# Patient Record
Sex: FEMALE | Race: OTHER | URBAN - METROPOLITAN AREA
[De-identification: names, ages, dates, MRNs, and addresses within clinical notes are randomized per-mention and may not be internally consistent; named-entity substitution may affect disease eponyms.]

---

## 2019-02-04 ENCOUNTER — OFFICE VISIT (OUTPATIENT)
Dept: FAMILY MEDICINE CLINIC | Facility: CLINIC | Age: 34
End: 2019-02-04
Payer: COMMERCIAL

## 2019-02-04 VITALS
HEART RATE: 86 BPM | WEIGHT: 150 LBS | RESPIRATION RATE: 12 BRPM | DIASTOLIC BLOOD PRESSURE: 84 MMHG | TEMPERATURE: 99 F | SYSTOLIC BLOOD PRESSURE: 122 MMHG | OXYGEN SATURATION: 99 % | BODY MASS INDEX: 29.45 KG/M2 | HEIGHT: 60 IN

## 2019-02-04 DIAGNOSIS — Z00.00 HEALTHCARE MAINTENANCE: ICD-10-CM

## 2019-02-04 DIAGNOSIS — F41.1 GAD (GENERALIZED ANXIETY DISORDER): ICD-10-CM

## 2019-02-04 DIAGNOSIS — R19.8 GLOBUS SENSATION: Primary | ICD-10-CM

## 2019-02-04 DIAGNOSIS — R13.10 DYSPHAGIA, UNSPECIFIED TYPE: ICD-10-CM

## 2019-02-04 DIAGNOSIS — Z13.6 SCREENING FOR CARDIOVASCULAR CONDITION: ICD-10-CM

## 2019-02-04 LAB
ALBUMIN SERPL BCP-MCNC: 4.5 G/DL (ref 3.5–4.8)
ALBUMIN/GLOB SERPL: 1.4 {RATIO} (ref 1–2)
ALP SERPL-CCNC: 53 U/L (ref 32–100)
ALT SERPL-CCNC: 27 U/L (ref 14–54)
ANION GAP SERPL CALC-SCNC: 13 MMOL/L (ref 0–18)
AST SERPL-CCNC: 18 U/L (ref 15–41)
BILIRUB SERPL-MCNC: 0.6 MG/DL (ref 0.3–1.2)
BUN SERPL-MCNC: 7 MG/DL (ref 8–20)
BUN/CREAT SERPL: 11.9 (ref 10–20)
CALCIUM SERPL-MCNC: 9.4 MG/DL (ref 8.5–10.5)
CHLORIDE SERPL-SCNC: 101 MMOL/L (ref 95–110)
CHOLEST SERPL-MCNC: 223 MG/DL (ref 110–200)
CO2 SERPL-SCNC: 22 MMOL/L (ref 22–32)
CREAT SERPL-MCNC: 0.59 MG/DL (ref 0.5–1.5)
DEPRECATED RDW RBC AUTO: 39.3 FL (ref 35.1–46.3)
ERYTHROCYTE [DISTWIDTH] IN BLOOD BY AUTOMATED COUNT: 12.8 % (ref 11–15)
GLOBULIN PLAS-MCNC: 3.2 G/DL (ref 2.5–3.7)
GLUCOSE SERPL-MCNC: 97 MG/DL (ref 70–99)
HCT VFR BLD AUTO: 42 % (ref 35–48)
HDLC SERPL-MCNC: 49 MG/DL
HGB BLD-MCNC: 13.2 G/DL (ref 12–16)
LDLC SERPL CALC-MCNC: 155 MG/DL (ref 0–99)
MCH RBC QN AUTO: 26.8 PG (ref 26–34)
MCHC RBC AUTO-ENTMCNC: 31.4 G/DL (ref 31–37)
MCV RBC AUTO: 85.2 FL (ref 80–100)
NONHDLC SERPL-MCNC: 174 MG/DL
OSMOLALITY UR CALC.SUM OF ELEC: 280 MOSM/KG (ref 275–295)
PATIENT FASTING: YES
PLATELET # BLD AUTO: 323 10(3)UL (ref 150–450)
POTASSIUM SERPL-SCNC: 3.7 MMOL/L (ref 3.3–5.1)
PROT SERPL-MCNC: 7.7 G/DL (ref 5.9–8.4)
RBC # BLD AUTO: 4.93 X10(6)UL (ref 3.8–5.3)
SODIUM SERPL-SCNC: 136 MMOL/L (ref 136–144)
TRIGL SERPL-MCNC: 95 MG/DL (ref 1–149)
TSH SERPL-ACNC: 1.38 UIU/ML (ref 0.45–5.33)
WBC # BLD AUTO: 6.6 X10(3) UL (ref 4–11)

## 2019-02-04 PROCEDURE — 80061 LIPID PANEL: CPT | Performed by: FAMILY MEDICINE

## 2019-02-04 PROCEDURE — 80050 GENERAL HEALTH PANEL: CPT | Performed by: FAMILY MEDICINE

## 2019-02-04 PROCEDURE — 99203 OFFICE O/P NEW LOW 30 MIN: CPT | Performed by: FAMILY MEDICINE

## 2019-02-04 PROCEDURE — 36415 COLL VENOUS BLD VENIPUNCTURE: CPT | Performed by: FAMILY MEDICINE

## 2019-02-04 RX ORDER — CLONAZEPAM 0.25 MG/1
0.25 TABLET, ORALLY DISINTEGRATING ORAL DAILY PRN
Qty: 14 TABLET | Refills: 0 | Status: SHIPPED | OUTPATIENT
Start: 2019-02-04 | End: 2019-02-18

## 2019-02-04 NOTE — PROGRESS NOTES
HPI:   Patient presents with:   Anxiety: New patient wants to discuss anxiety treatment  Throat Problem: c/c dry throat and difficulty swallowing x1 week  Other: last pap 6-7 yrs ago       Edgar Arenas is a 26year old female presenting for:    D (36.9 °C)   Resp 12   Ht 60\"   Wt 150 lb   LMP 01/28/2019 (Exact Date)   SpO2 99%   BMI 29.29 kg/m²  Estimated body mass index is 29.29 kg/m² as calculated from the following:    Height as of this encounter: 60\". Weight as of this encounter: 150 lb. above plan. Reasurrance and education provided. All questions answered. Notified to call with any questions, complications, allergies, or worsening or changing symptoms as well as any side effects or complications from the treatments .   Red flags/ ER pre

## 2019-02-18 ENCOUNTER — OFFICE VISIT (OUTPATIENT)
Dept: FAMILY MEDICINE CLINIC | Facility: CLINIC | Age: 34
End: 2019-02-18
Payer: COMMERCIAL

## 2019-02-18 VITALS
OXYGEN SATURATION: 97 % | HEART RATE: 101 BPM | BODY MASS INDEX: 29.64 KG/M2 | WEIGHT: 151 LBS | DIASTOLIC BLOOD PRESSURE: 78 MMHG | HEIGHT: 60 IN | RESPIRATION RATE: 14 BRPM | SYSTOLIC BLOOD PRESSURE: 110 MMHG

## 2019-02-18 DIAGNOSIS — Z00.00 WELLNESS EXAMINATION: Primary | ICD-10-CM

## 2019-02-18 DIAGNOSIS — Z12.4 PAP SMEAR FOR CERVICAL CANCER SCREENING: ICD-10-CM

## 2019-02-18 DIAGNOSIS — R19.8 GLOBUS SENSATION: ICD-10-CM

## 2019-02-18 DIAGNOSIS — Z00.00 HEALTHCARE MAINTENANCE: ICD-10-CM

## 2019-02-18 DIAGNOSIS — F41.1 GAD (GENERALIZED ANXIETY DISORDER): ICD-10-CM

## 2019-02-18 PROCEDURE — 99213 OFFICE O/P EST LOW 20 MIN: CPT | Performed by: FAMILY MEDICINE

## 2019-02-18 PROCEDURE — 99395 PREV VISIT EST AGE 18-39: CPT | Performed by: FAMILY MEDICINE

## 2019-02-18 PROCEDURE — 87624 HPV HI-RISK TYP POOLED RSLT: CPT | Performed by: FAMILY MEDICINE

## 2019-02-18 NOTE — PROGRESS NOTES
CC: Annual Physical Exam    HPI:   Christi Gomez is a 26year old female who presents for a complete physical exam.    HCM  -Diet:  Well-balanced.   -Exercise regularly  -Mental Health: + depression or anxiety sx  -Skin care:  no concerning lesions the [employer] [facility] and a cytotechnologist.      Embedded Images      Procedure       Monolayers:  1      Clinical Information       Z12.4 Pap Smear For Cervical Cancer Screening.         Reason for testing Screening     Gyn Additional Information changes.  Denies hearing loss, congestion, runny nose or sore throat. + globus sensation  INTEGUMENTARY:  Denies rashes, skin lesion  CARDIOVASCULAR:  Denies chest pain, chest pressure, chest discomfort, palpitations, edema, dyspnea on exertion or at rest. No edema, FROM  NEURO:  No deficit, normal gait, strength and tone, sensory intact, normal reflexes.   Psych: tearful and anxious    ASSESSMENT AND PLAN:   Romy Renner is a 26year old female who presents for a complete physical exam.      Health

## 2019-02-19 LAB — HPV I/H RISK 1 DNA SPEC QL NAA+PROBE: NEGATIVE

## 2019-02-20 ENCOUNTER — OFFICE VISIT (OUTPATIENT)
Dept: OTOLARYNGOLOGY | Facility: CLINIC | Age: 34
End: 2019-02-20
Payer: COMMERCIAL

## 2019-02-20 VITALS
SYSTOLIC BLOOD PRESSURE: 140 MMHG | HEIGHT: 60 IN | BODY MASS INDEX: 29.64 KG/M2 | TEMPERATURE: 98 F | DIASTOLIC BLOOD PRESSURE: 90 MMHG | WEIGHT: 151 LBS

## 2019-02-20 DIAGNOSIS — R13.12 OROPHARYNGEAL DYSPHAGIA: Primary | ICD-10-CM

## 2019-02-20 PROCEDURE — 31575 DIAGNOSTIC LARYNGOSCOPY: CPT | Performed by: OTOLARYNGOLOGY

## 2019-02-20 PROCEDURE — 99212 OFFICE O/P EST SF 10 MIN: CPT | Performed by: OTOLARYNGOLOGY

## 2019-02-20 PROCEDURE — 99243 OFF/OP CNSLTJ NEW/EST LOW 30: CPT | Performed by: OTOLARYNGOLOGY

## 2019-02-20 NOTE — PROGRESS NOTES
Melinda Solares is a 26year old female.   Patient presents with:  Throat Problem: per pt she feels something stucked at her throat for 3 weeks      HISTORY OF PRESENT ILLNESS  2/20/2019  Patient presents for evaluation of  Dry mouth and occasional dayna syncope. GI Negative Abdominal pain and diarrhea. Endocrine Negative Cold intolerance and heat intolerance. Neuro Negative Tremors. Psych Negative Anxiety and depression. Integumentary Negative Frequent skin infections, pigment change and rash. bilateral nasal cavities. The flexible fiberoptic laryngoscope was threaded into the right nasal cavity and threaded into the nasopharynx. The septum was noted to be nl. The turbinates were non enlarged and No intranasal polyps were noted.  Nasopharynx was

## 2019-02-21 PROBLEM — R09.A2 GLOBUS SENSATION: Status: ACTIVE | Noted: 2019-02-21

## 2019-02-21 PROBLEM — F41.1 GAD (GENERALIZED ANXIETY DISORDER): Status: ACTIVE | Noted: 2019-02-21

## 2019-02-21 PROBLEM — R09.89 GLOBUS SENSATION: Status: ACTIVE | Noted: 2019-02-21

## 2019-02-21 PROBLEM — R19.8 GLOBUS SENSATION: Status: ACTIVE | Noted: 2019-02-21

## 2019-04-02 ENCOUNTER — OFFICE VISIT (OUTPATIENT)
Dept: FAMILY MEDICINE CLINIC | Facility: CLINIC | Age: 34
End: 2019-04-02
Payer: COMMERCIAL

## 2019-04-02 VITALS
SYSTOLIC BLOOD PRESSURE: 133 MMHG | TEMPERATURE: 98 F | WEIGHT: 149 LBS | DIASTOLIC BLOOD PRESSURE: 82 MMHG | OXYGEN SATURATION: 99 % | RESPIRATION RATE: 14 BRPM | HEIGHT: 60 IN | HEART RATE: 86 BPM | BODY MASS INDEX: 29.25 KG/M2

## 2019-04-02 DIAGNOSIS — J02.9 SORE THROAT: Primary | ICD-10-CM

## 2019-04-02 DIAGNOSIS — Z20.818 STREPTOCOCCUS EXPOSURE: ICD-10-CM

## 2019-04-02 PROCEDURE — 87081 CULTURE SCREEN ONLY: CPT | Performed by: NURSE PRACTITIONER

## 2019-04-02 PROCEDURE — 87880 STREP A ASSAY W/OPTIC: CPT | Performed by: NURSE PRACTITIONER

## 2019-04-02 PROCEDURE — 99212 OFFICE O/P EST SF 10 MIN: CPT | Performed by: NURSE PRACTITIONER

## 2019-04-02 NOTE — PATIENT INSTRUCTIONS
When You Have a Sore Throat    A sore throat can be painful. There are many reasons why you may have a sore throat. Your healthcare provider will work with you to find the cause of your sore throat. He or she will also find the best treatment for you. During the exam, your healthcare provider checks your ears, nose, and throat for problems.  He or she also checks for swelling in the neck, and may listen to your chest.  Possible tests  Other tests your healthcare provider may perform include:  · A throat If your sore throat is due to a bacterial infection, antibiotics may speed healing and prevent complications.  Although group A streptococcus (\"strep throat\" or GAS) is the major treatable infection for a sore throat, GAS causes only 5% to 15% of sore thr © 1882-9806 [employer] [address]. [address] [facility] [place], [address] [place]. All rights reserved. This information is not intended as a substitute for professional medical care. Always follow your healthcare professional's instructions.           Self- · Limit contact with pets and with allergy-causing substances, such as pollen and mold. · When you’re around someone with a sore throat or cold, wash your hands often to keep viruses or bacteria from spreading. · Don’t strain your vocal cords.   Call your

## 2019-04-02 NOTE — PROGRESS NOTES
CHIEF COMPLAINT:   Patient presents with:  Sore Throat      HPI:   Junie Gaucher is a 26year old female who presents to clinic with symptoms of sore throat. Patient has had for 3 days. Symptoms have been about the same since onset.   Patient repo EARS: TM's clear, non-injected, no bulging, no retraction. + small amount of fluid  NOSE: nostrils patent, no exudates, nasal mucosa pink and noninflamed  THROAT: oral mucosa pink, moist. Posterior pharynx not erythematous and injected. no exudates. +pnd. The patient is asked to contact their PCP in 3 days if not better or fever greater than or equal to 100.4 persists for 72 hours. Seek immediate care if symptoms are worsening.      Patient Instructions         When You Have a Sore Throat    A sore throat ca · Have you been told that you snore or have other sleep problems? · Do you have bad breath? · Do you cough up bad-tasting mucus? Physical exam  During the exam, your healthcare provider checks your ears, nose, and throat for problems.  He or she also ekaterina If your sore throat is due to a bacterial infection, antibiotics may speed healing and prevent complications.  Although group A streptococcus (\"strep throat\" or GAS) is the major treatable infection for a sore throat, GAS causes only 5% to 15% of sore thr © 4647-8902 [employer] [address]. [address] [facility] [place], [address] [place]. All rights reserved. This information is not intended as a substitute for professional medical care. Always follow your healthcare professional's instructions.           Self- · Limit contact with pets and with allergy-causing substances, such as pollen and mold. · When you’re around someone with a sore throat or cold, wash your hands often to keep viruses or bacteria from spreading. · Don’t strain your vocal cords.   Call your

## 2019-06-17 ENCOUNTER — TELEPHONE (OUTPATIENT)
Dept: INTERNAL MEDICINE CLINIC | Facility: CLINIC | Age: 34
End: 2019-06-17

## 2019-06-17 NOTE — TELEPHONE ENCOUNTER
Pt called, she is starting a new job & they are requesting a statement of general health, she was last seen in Feb of this yr, she also needs orders for TB skin test which I scheduled this Wednesday for nurse visit.

## 2019-06-19 ENCOUNTER — TELEPHONE (OUTPATIENT)
Dept: FAMILY MEDICINE CLINIC | Facility: CLINIC | Age: 34
End: 2019-06-19

## 2019-06-19 ENCOUNTER — NURSE ONLY (OUTPATIENT)
Dept: INTERNAL MEDICINE CLINIC | Facility: CLINIC | Age: 34
End: 2019-06-19
Payer: COMMERCIAL

## 2019-06-19 DIAGNOSIS — Z11.1 ENCOUNTER FOR PPD TEST: ICD-10-CM

## 2019-06-19 DIAGNOSIS — Z00.00 WELLNESS EXAMINATION: Primary | ICD-10-CM

## 2019-06-19 PROCEDURE — 86580 TB INTRADERMAL TEST: CPT | Performed by: FAMILY MEDICINE

## 2019-06-19 NOTE — TELEPHONE ENCOUNTER
Pt called the office today requesting a general statement of good health. She states that she is starting a new job soon, and they are not requesting that a certain form be filled out, only requesting a general letter of good health for the pt.  Pt will be

## 2019-06-21 ENCOUNTER — NURSE ONLY (OUTPATIENT)
Dept: INTERNAL MEDICINE CLINIC | Facility: CLINIC | Age: 34
End: 2019-06-21
Payer: COMMERCIAL

## 2019-06-21 NOTE — PROGRESS NOTES
Pt presented to clinic for TB reading. TB test located on RFA read 0.0mm interpreted as negative. Pt dropped off for Dr. Seth Bradford to fill out. Placed in physician's box.   Would like to  Monday 06/24/2019.  Ousmane Haq

## 2019-12-12 ENCOUNTER — OFFICE VISIT (OUTPATIENT)
Dept: FAMILY MEDICINE CLINIC | Facility: CLINIC | Age: 34
End: 2019-12-12
Payer: COMMERCIAL

## 2019-12-12 VITALS
OXYGEN SATURATION: 95 % | TEMPERATURE: 98 F | DIASTOLIC BLOOD PRESSURE: 70 MMHG | HEIGHT: 60 IN | WEIGHT: 159 LBS | BODY MASS INDEX: 31.22 KG/M2 | SYSTOLIC BLOOD PRESSURE: 108 MMHG | HEART RATE: 87 BPM

## 2019-12-12 DIAGNOSIS — J01.40 ACUTE NON-RECURRENT PANSINUSITIS: Primary | ICD-10-CM

## 2019-12-12 PROCEDURE — 99213 OFFICE O/P EST LOW 20 MIN: CPT | Performed by: FAMILY MEDICINE

## 2019-12-12 RX ORDER — FLUTICASONE PROPIONATE 50 MCG
2 SPRAY, SUSPENSION (ML) NASAL DAILY
Qty: 1 BOTTLE | Refills: 0 | Status: SHIPPED | OUTPATIENT
Start: 2019-12-12

## 2019-12-12 NOTE — PROGRESS NOTES
HPI:   Patient presents with:  Sinus Problem: feels pressure      Vern Henrique is a 37year old female presenting for:    1wk ago w/ head/chest congestion, ear pressure, congestion sinus pressure, headache, cough  No f/c, malaises  Took mucinez • Other (Other) Other         thyroid          REVIEW OF SYSTEMS:   Review of Systems   Constitutional: Negative for fatigue and fever. HENT: Positive for congestion, postnasal drip and sinus pressure. Respiratory: Positive for cough.  Negative for s is a 37year old female who presents primarily presents for:    Diagnoses and all orders for this visit:    Acute non-recurrent pansinusitis  -     Fluticasone Propionate 50 MCG/ACT Nasal Suspension; 2 sprays by Each Nare route daily.  -Keep well hydrated;

## 2022-01-09 ENCOUNTER — HOSPITAL ENCOUNTER (OUTPATIENT)
Age: 37
Discharge: HOME | End: 2022-01-09
Payer: COMMERCIAL

## 2022-01-09 VITALS
SYSTOLIC BLOOD PRESSURE: 117 MMHG | HEART RATE: 89 BPM | TEMPERATURE: 99 F | OXYGEN SATURATION: 98 % | DIASTOLIC BLOOD PRESSURE: 84 MMHG | RESPIRATION RATE: 16 BRPM

## 2022-01-09 DIAGNOSIS — R05.9 COUGH: ICD-10-CM

## 2022-01-09 DIAGNOSIS — R09.81 NASAL CONGESTION: ICD-10-CM

## 2022-01-09 DIAGNOSIS — Z86.16 HISTORY OF COVID-19: Primary | ICD-10-CM

## 2022-01-09 PROCEDURE — 99213 OFFICE O/P EST LOW 20 MIN: CPT | Performed by: NURSE PRACTITIONER

## 2022-01-09 RX ORDER — ALBUTEROL SULFATE 90 UG/1
2 AEROSOL, METERED RESPIRATORY (INHALATION) EVERY 4 HOURS PRN
Qty: 1 EACH | Refills: 0 | Status: SHIPPED | OUTPATIENT
Start: 2022-01-09 | End: 2022-02-08

## 2022-01-09 NOTE — ED INITIAL ASSESSMENT (HPI)
Pt tested positive for COVID just a few days after 12/25/21 and she states that her cough has not yet resolved. Pt is fully vaccinated for COVID and denies any other symptoms.

## 2022-01-09 NOTE — ED PROVIDER NOTES
Patient Seen in: [facility]      History   Patient presents with:  Cough/URI    Stated Complaint: COVID POSITIVE- COUGH HEAD/EAR PRESSURE    Subjective:   HPI    This is a 41-year-old female who recently had Covid presenting with cough.   Janneth range of motion. Cervical back: Normal range of motion. Skin:     General: Skin is warm and dry. Capillary Refill: Capillary refill takes less than 2 seconds. Neurological:      General: No focal deficit present.       Mental Status: She is al

## 2023-08-31 ENCOUNTER — HOSPITAL ENCOUNTER (OUTPATIENT)
Age: 38
Discharge: HOME | End: 2023-08-31
Payer: COMMERCIAL

## 2023-08-31 VITALS
HEART RATE: 93 BPM | TEMPERATURE: 98 F | RESPIRATION RATE: 18 BRPM | SYSTOLIC BLOOD PRESSURE: 120 MMHG | OXYGEN SATURATION: 98 % | DIASTOLIC BLOOD PRESSURE: 65 MMHG

## 2023-08-31 DIAGNOSIS — J06.9 VIRAL UPPER RESPIRATORY TRACT INFECTION: ICD-10-CM

## 2023-08-31 DIAGNOSIS — J40 BRONCHITIS: Primary | ICD-10-CM

## 2023-08-31 PROCEDURE — 99213 OFFICE O/P EST LOW 20 MIN: CPT | Performed by: PHYSICIAN ASSISTANT

## 2023-08-31 RX ORDER — GUAIFENESIN AND DEXTROMETHORPHAN HYDROBROMIDE 1200; 60 MG/1; MG/1
1 TABLET, EXTENDED RELEASE ORAL EVERY 12 HOURS
Qty: 10 TABLET | Refills: 0 | Status: SHIPPED | OUTPATIENT
Start: 2023-08-31 | End: 2023-09-05

## 2023-08-31 RX ORDER — ALBUTEROL SULFATE 90 UG/1
2 AEROSOL, METERED RESPIRATORY (INHALATION) EVERY 4 HOURS PRN
Qty: 1 EACH | Refills: 0 | Status: SHIPPED | OUTPATIENT
Start: 2023-08-31 | End: 2023-09-30

## 2024-06-11 ENCOUNTER — OFFICE VISIT (OUTPATIENT)
Dept: INTERNAL MEDICINE CLINIC | Facility: CLINIC | Age: 39
End: 2024-06-11

## 2024-06-11 ENCOUNTER — EKG ENCOUNTER (OUTPATIENT)
Dept: LAB | Facility: HOSPITAL | Age: 39
End: 2024-06-11
Attending: INTERNAL MEDICINE
Payer: COMMERCIAL

## 2024-06-11 VITALS
WEIGHT: 180.63 LBS | SYSTOLIC BLOOD PRESSURE: 118 MMHG | HEIGHT: 60 IN | OXYGEN SATURATION: 98 % | HEART RATE: 71 BPM | BODY MASS INDEX: 35.46 KG/M2 | DIASTOLIC BLOOD PRESSURE: 78 MMHG

## 2024-06-11 DIAGNOSIS — Z91.89 CARDIOVASCULAR RISK FACTOR: ICD-10-CM

## 2024-06-11 DIAGNOSIS — Z11.59 NEED FOR HEPATITIS C SCREENING TEST: ICD-10-CM

## 2024-06-11 DIAGNOSIS — Z13.1 DIABETES MELLITUS SCREENING: ICD-10-CM

## 2024-06-11 DIAGNOSIS — Z12.31 SCREENING MAMMOGRAM FOR BREAST CANCER: ICD-10-CM

## 2024-06-11 DIAGNOSIS — Z76.89 ESTABLISHING CARE WITH NEW DOCTOR, ENCOUNTER FOR: ICD-10-CM

## 2024-06-11 DIAGNOSIS — F41.9 ANXIETY: ICD-10-CM

## 2024-06-11 DIAGNOSIS — Z12.4 PAP SMEAR FOR CERVICAL CANCER SCREENING: ICD-10-CM

## 2024-06-11 DIAGNOSIS — Z76.89 ESTABLISHING CARE WITH NEW DOCTOR, ENCOUNTER FOR: Primary | ICD-10-CM

## 2024-06-11 LAB
ALBUMIN SERPL-MCNC: 5 G/DL (ref 3.2–4.8)
ALBUMIN/GLOB SERPL: 1.8 {RATIO} (ref 1–2)
ALP LIVER SERPL-CCNC: 61 U/L
ALT SERPL-CCNC: 33 U/L
ANION GAP SERPL CALC-SCNC: 5 MMOL/L (ref 0–18)
AST SERPL-CCNC: 22 U/L (ref ?–34)
ATRIAL RATE: 81 BPM
BASOPHILS # BLD AUTO: 0.05 X10(3) UL (ref 0–0.2)
BASOPHILS NFR BLD AUTO: 0.6 %
BILIRUB SERPL-MCNC: 0.5 MG/DL (ref 0.3–1.2)
BUN BLD-MCNC: 8 MG/DL (ref 9–23)
BUN/CREAT SERPL: 11.3 (ref 10–20)
CALCIUM BLD-MCNC: 9.6 MG/DL (ref 8.7–10.4)
CHLORIDE SERPL-SCNC: 107 MMOL/L (ref 98–112)
CHOLEST SERPL-MCNC: 216 MG/DL (ref ?–200)
CO2 SERPL-SCNC: 28 MMOL/L (ref 21–32)
CREAT BLD-MCNC: 0.71 MG/DL
DEPRECATED RDW RBC AUTO: 39.4 FL (ref 35.1–46.3)
EGFRCR SERPLBLD CKD-EPI 2021: 110 ML/MIN/1.73M2 (ref 60–?)
EOSINOPHIL # BLD AUTO: 0.06 X10(3) UL (ref 0–0.7)
EOSINOPHIL NFR BLD AUTO: 0.8 %
ERYTHROCYTE [DISTWIDTH] IN BLOOD BY AUTOMATED COUNT: 12.9 % (ref 11–15)
EST. AVERAGE GLUCOSE BLD GHB EST-MCNC: 126 MG/DL (ref 68–126)
FASTING PATIENT LIPID ANSWER: NO
FASTING STATUS PATIENT QL REPORTED: NO
GLOBULIN PLAS-MCNC: 2.8 G/DL (ref 2–3.5)
GLUCOSE BLD-MCNC: 86 MG/DL (ref 70–99)
HBA1C MFR BLD: 6 % (ref ?–5.7)
HCT VFR BLD AUTO: 40.2 %
HCV AB SERPL QL IA: NONREACTIVE
HDLC SERPL-MCNC: 42 MG/DL (ref 40–59)
HGB BLD-MCNC: 12.8 G/DL
IMM GRANULOCYTES # BLD AUTO: 0.02 X10(3) UL (ref 0–1)
IMM GRANULOCYTES NFR BLD: 0.3 %
LDLC SERPL CALC-MCNC: 153 MG/DL (ref ?–100)
LYMPHOCYTES # BLD AUTO: 2.03 X10(3) UL (ref 1–4)
LYMPHOCYTES NFR BLD AUTO: 25.4 %
MCH RBC QN AUTO: 26.8 PG (ref 26–34)
MCHC RBC AUTO-ENTMCNC: 31.8 G/DL (ref 31–37)
MCV RBC AUTO: 84.1 FL
MONOCYTES # BLD AUTO: 0.48 X10(3) UL (ref 0.1–1)
MONOCYTES NFR BLD AUTO: 6 %
NEUTROPHILS # BLD AUTO: 5.36 X10 (3) UL (ref 1.5–7.7)
NEUTROPHILS # BLD AUTO: 5.36 X10(3) UL (ref 1.5–7.7)
NEUTROPHILS NFR BLD AUTO: 66.9 %
NONHDLC SERPL-MCNC: 174 MG/DL (ref ?–130)
OSMOLALITY SERPL CALC.SUM OF ELEC: 288 MOSM/KG (ref 275–295)
P AXIS: 25 DEGREES
P-R INTERVAL: 132 MS
PLATELET # BLD AUTO: 293 10(3)UL (ref 150–450)
POTASSIUM SERPL-SCNC: 3.8 MMOL/L (ref 3.5–5.1)
PROT SERPL-MCNC: 7.8 G/DL (ref 5.7–8.2)
Q-T INTERVAL: 366 MS
QRS DURATION: 88 MS
QTC CALCULATION (BEZET): 425 MS
R AXIS: 36 DEGREES
RBC # BLD AUTO: 4.78 X10(6)UL
SODIUM SERPL-SCNC: 140 MMOL/L (ref 136–145)
T AXIS: 5 DEGREES
T4 FREE SERPL-MCNC: 1.1 NG/DL (ref 0.8–1.7)
TRIGL SERPL-MCNC: 118 MG/DL (ref 30–149)
TSI SER-ACNC: 1.76 MIU/ML (ref 0.55–4.78)
VENTRICULAR RATE: 81 BPM
VLDLC SERPL CALC-MCNC: 23 MG/DL (ref 0–30)
WBC # BLD AUTO: 8 X10(3) UL (ref 4–11)

## 2024-06-11 PROCEDURE — 85025 COMPLETE CBC W/AUTO DIFF WBC: CPT | Performed by: INTERNAL MEDICINE

## 2024-06-11 PROCEDURE — 80053 COMPREHEN METABOLIC PANEL: CPT

## 2024-06-11 PROCEDURE — 83036 HEMOGLOBIN GLYCOSYLATED A1C: CPT | Performed by: INTERNAL MEDICINE

## 2024-06-11 PROCEDURE — 99204 OFFICE O/P NEW MOD 45 MIN: CPT | Performed by: INTERNAL MEDICINE

## 2024-06-11 PROCEDURE — 80061 LIPID PANEL: CPT | Performed by: INTERNAL MEDICINE

## 2024-06-11 PROCEDURE — 86803 HEPATITIS C AB TEST: CPT | Performed by: INTERNAL MEDICINE

## 2024-06-11 PROCEDURE — 3008F BODY MASS INDEX DOCD: CPT | Performed by: INTERNAL MEDICINE

## 2024-06-11 PROCEDURE — 3074F SYST BP LT 130 MM HG: CPT | Performed by: INTERNAL MEDICINE

## 2024-06-11 PROCEDURE — 3078F DIAST BP <80 MM HG: CPT | Performed by: INTERNAL MEDICINE

## 2024-06-11 PROCEDURE — 84439 ASSAY OF FREE THYROXINE: CPT

## 2024-06-11 PROCEDURE — 93005 ELECTROCARDIOGRAM TRACING: CPT

## 2024-06-11 PROCEDURE — 93010 ELECTROCARDIOGRAM REPORT: CPT | Performed by: INTERNAL MEDICINE

## 2024-06-11 PROCEDURE — 84443 ASSAY THYROID STIM HORMONE: CPT

## 2024-06-18 ENCOUNTER — PATIENT MESSAGE (OUTPATIENT)
Dept: INTERNAL MEDICINE CLINIC | Facility: CLINIC | Age: 39
End: 2024-06-18

## 2024-06-18 NOTE — PROGRESS NOTES
[facility]  New Patient History and Physical      HPI:     Chief Complaint   Patient presents with    Physical       Greer Ricardo is a 38 year old female presenting for:  [facility].  .  Has  has a past medical history of Anxiety.      Reports history of anxiety.        Labs:   Complete Metabolic Panel:  Lab Results   Component Value Date/Time     06/11/2024 03:49 PM    K 3.8 06/11/2024 03:49 PM     06/11/2024 03:49 PM    CO2 28.0 06/11/2024 03:49 PM    CREATSERUM 0.71 06/11/2024 03:49 PM    CA 9.6 06/11/2024 03:49 PM    GLU 86 06/11/2024 03:49 PM    TP 7.8 06/11/2024 03:49 PM    ALB 5.0 (H) 06/11/2024 03:49 PM    ALKPHO 61 06/11/2024 03:49 PM    AST 22 06/11/2024 03:49 PM    ALT 33 06/11/2024 03:49 PM    BILT 0.5 06/11/2024 03:49 PM    TSH 1.765 06/11/2024 03:49 PM    T4F 1.1 06/11/2024 03:49 PM        CBC:  @LABRCNTIP(RBC:3,HGB:3,HCT:3,MCV:3,MCH:3,MCHC:3,RDW:3,NEPRELIM:3,WBC:3,PLT:3)@       Hemoglobin A1C, Microalbumin  Lab Results   Component Value Date/Time    A1C 6.0 (H) 06/11/2024 03:49 PM        Lipid panel  Lab Results   Component Value Date/Time    CHOLEST 216 (H) 06/11/2024 03:49 PM    HDL 42 06/11/2024 03:49 PM    TRIG 118 06/11/2024 03:49 PM     (H) 06/11/2024 03:49 PM    NONHDLC 174 (H) 06/11/2024 03:49 PM     The 10-year ASCVD risk score (Aida SALAZAR, et al., 2019) is: 1%    Values used to calculate the score:      Age: 38 years      Sex: Female      Is Non- : No      Diabetic: No      Tobacco smoker: No      Systolic Blood Pressure: 118 mmHg      Is BP treated: No      HDL Cholesterol: 42 mg/dL      Total Cholesterol: 216 mg/dL       Medications:  Current Outpatient Medications   Medication Sig Dispense Refill    Pseudoephedrine-guaiFENesin (MUCINEX D MAX STRENGTH OR) Take by mouth. (Patient not taking: Reported on 6/11/2024)      Fluticasone Propionate 50 MCG/ACT Nasal Suspension 2 sprays by Each Nare route daily. (Patient  not taking: Reported on 2024) 1 Bottle 0      PMH:  Past Medical History:    Anxiety         PSH:  Past Surgical History:   Procedure Laterality Date             Allergies:  No Known Allergies   Social History:  Social History     Socioeconomic History    Marital status:    Tobacco Use    Smoking status: Never    Smokeless tobacco: Never   Substance and Sexual Activity    Alcohol use: No        Family History:  Family History   Problem Relation Age of Onset    Asthma Son     Other (Other) Other         thyroid          REVIEW OF SYSTEMS:   Review of Systems   Constitutional:  Negative for chills, fatigue, fever and unexpected weight change.   HENT:  Negative for congestion, ear pain, hearing loss, rhinorrhea, sinus pain and sore throat.    Eyes:  Negative for pain, redness and visual disturbance.   Respiratory:  Negative for apnea, cough, chest tightness, shortness of breath and wheezing.    Cardiovascular:  Negative for chest pain, palpitations and leg swelling.   Gastrointestinal:  Negative for abdominal distention, abdominal pain, blood in stool, constipation and nausea.   Endocrine: Negative for cold intolerance, heat intolerance and polyuria.   Genitourinary:  Negative for dysuria, hematuria and urgency.   Musculoskeletal:  Negative for arthralgias, back pain, gait problem, joint swelling, myalgias and neck pain.   Skin:  Negative for rash and wound.   Allergic/Immunologic: Negative for food allergies and immunocompromised state.   Neurological:  Negative for dizziness, seizures, facial asymmetry, speech difficulty, weakness, light-headedness, numbness and headaches.   Hematological:  Negative for adenopathy. Does not bruise/bleed easily.   Psychiatric/Behavioral:  Negative for behavioral problems, sleep disturbance and suicidal ideas. The patient is not nervous/anxious.             PHYSICAL EXAM:   /78   Pulse 71   Ht 5' (1.524 m)   Wt 180 lb 9.6 oz (81.9 kg)   LMP 2023    SpO2 98%   BMI 35.27 kg/m²  Estimated body mass index is 35.27 kg/m² as calculated from the following:    Height as of this encounter: 5' (1.524 m).    Weight as of this encounter: 180 lb 9.6 oz (81.9 kg).     Wt Readings from Last 3 Encounters:   06/11/24 180 lb 9.6 oz (81.9 kg)   12/12/19 159 lb (72.1 kg)   04/02/19 149 lb (67.6 kg)       Physical Exam  Vitals reviewed.   Constitutional:       General: She is not in acute distress.     Appearance: She is well-developed.   HENT:      Head: Normocephalic and atraumatic.   Eyes:      Conjunctiva/sclera: Conjunctivae normal.      Pupils: Pupils are equal, round, and reactive to light.   Neck:      Thyroid: No thyromegaly.   Cardiovascular:      Rate and Rhythm: Normal rate and regular rhythm.      Heart sounds: Normal heart sounds, S1 normal and S2 normal. No murmur heard.     No friction rub. No gallop.   Pulmonary:      Effort: Pulmonary effort is normal. No respiratory distress.      Breath sounds: Normal breath sounds. No wheezing or rales.   Chest:      Chest wall: No tenderness.   Abdominal:      General: Bowel sounds are normal. There is no distension.      Palpations: Abdomen is soft. There is no mass.      Tenderness: There is no abdominal tenderness. There is no guarding or rebound.   Musculoskeletal:         General: No tenderness. Normal range of motion.      Cervical back: Normal range of motion.   Lymphadenopathy:      Cervical: No cervical adenopathy.   Skin:     General: Skin is warm.      Findings: No erythema or rash.   Neurological:      Mental Status: She is alert and oriented to person, place, and time.      Cranial Nerves: No cranial nerve deficit.      Deep Tendon Reflexes: Reflexes are normal and symmetric.   Psychiatric:         Behavior: Behavior normal.         Thought Content: Thought content normal.         Judgment: Judgment normal.             ASSESSMENT AND PLAN:   Patient is a 38 year old female who presents primarily presents  for:    (Z76.89) Establishing care with new doctor, encounter for  (primary encounter diagnosis)  Plan: CBC With Differential With Platelet, Comp         Metabolic Panel (14), Lipid Panel            (Z12.31) Screening mammogram for breast cancer  Plan: [facility] EUSEBIO 2D+3D SCREENING BILAT         (CPT=77067/25673)            (Z12.4) Pap smear for cervical cancer screening  Plan: OBG Referral - In Network            (Z13.1) Diabetes mellitus screening  Plan: Hemoglobin A1C            (Z11.59) Need for hepatitis C screening test  Plan: HCV Antibody            (F41.9) Anxiety  Plan: TSH and Free T4            (Z91.89) Cardiovascular risk factor  Plan: CT CALCIUM SCORING, EKG 12 Lead to be performed        at [facility]                    Health Maintenance:  Health Maintenance   Topic Date Due    Annual Physical  Never done    Mammogram  Never done    DTaP,Tdap,and Td Vaccines (1 - Tdap) Never done    Pap Smear  02/18/2022    COVID-19 Vaccine (3 - 2023-24 season) 09/01/2023    Annual Depression Screening  01/01/2024    Influenza Vaccine (Season Ended) 10/01/2024    Pneumococcal Vaccine: Birth to 64yrs  Aged Out             Meds & Refills for this Visit:  Requested Prescriptions      No prescriptions requested or ordered in this encounter       Orders Placed This Encounter   Procedures    CBC With Differential With Platelet    Comp Metabolic Panel (14)    HCV Antibody    Hemoglobin A1C    Lipid Panel    TSH and Free T4       Imaging & Consults:  [facility] - INTERNAL  GRACIE EUSEBIO 2D+3D SCREENING BILAT (CPT=77067/78919)  CT CALCIUM SCORING        Fernando Henry MD     Return in about 3 months (around 9/11/2024).  Important issues to follow up on next visit      Patient indicates understanding of the above recommendations and agrees to the above plan.  Reasurrance and education provided. All questions answered.  Notified to call with any questions, complications, allergies, or worsening or changing symptoms as well as  any side effects or complications from the treatments .  Red flags/ ER precautions discussed.    This note was produced using voice recognition software.  As a result, errors may occur.  When identified, these errors have been corrected.  While every attempt is made to correct errors during dictation, errors may still exist.    Total time spent was 45 minutes which includes: Preparation to see patient including chart review, reviewing appropriate medical history, counseling and education (diet and exercise), discussing treatment options, ordering appropriate diagnostic tests and documentation.    Fernando Henry MD  Internal Medicine/Primary Care  [facility]

## 2024-06-28 ENCOUNTER — HOSPITAL ENCOUNTER (OUTPATIENT)
Dept: CT IMAGING | Age: 39
Discharge: HOME | End: 2024-06-28
Attending: INTERNAL MEDICINE

## 2024-06-28 DIAGNOSIS — Z91.89 CARDIOVASCULAR RISK FACTOR: ICD-10-CM

## 2024-07-11 ENCOUNTER — HOSPITAL ENCOUNTER (OUTPATIENT)
Dept: MAMMOGRAPHY | Age: 39
Discharge: HOME | End: 2024-07-11
Attending: INTERNAL MEDICINE
Payer: COMMERCIAL

## 2024-07-11 DIAGNOSIS — Z12.31 SCREENING MAMMOGRAM FOR BREAST CANCER: ICD-10-CM

## 2024-07-11 PROCEDURE — 77063 BREAST TOMOSYNTHESIS BI: CPT | Performed by: INTERNAL MEDICINE

## 2024-07-11 PROCEDURE — 77067 SCR MAMMO BI INCL CAD: CPT | Performed by: INTERNAL MEDICINE

## 2024-09-11 ENCOUNTER — OFFICE VISIT (OUTPATIENT)
Dept: INTERNAL MEDICINE CLINIC | Facility: CLINIC | Age: 39
End: 2024-09-11
Payer: COMMERCIAL

## 2024-09-11 ENCOUNTER — LAB ENCOUNTER (OUTPATIENT)
Dept: LAB | Facility: HOSPITAL | Age: 39
End: 2024-09-11
Attending: INTERNAL MEDICINE
Payer: COMMERCIAL

## 2024-09-11 VITALS
WEIGHT: 161 LBS | HEIGHT: 60 IN | BODY MASS INDEX: 31.61 KG/M2 | DIASTOLIC BLOOD PRESSURE: 78 MMHG | SYSTOLIC BLOOD PRESSURE: 100 MMHG | OXYGEN SATURATION: 99 % | HEART RATE: 83 BPM

## 2024-09-11 DIAGNOSIS — Z00.00 ANNUAL PHYSICAL EXAM: Primary | ICD-10-CM

## 2024-09-11 DIAGNOSIS — R73.03 PREDIABETES: ICD-10-CM

## 2024-09-11 LAB
CHOLEST SERPL-MCNC: 186 MG/DL (ref ?–200)
EST. AVERAGE GLUCOSE BLD GHB EST-MCNC: 123 MG/DL (ref 68–126)
FASTING PATIENT LIPID ANSWER: YES
HBA1C MFR BLD: 5.9 % (ref ?–5.7)
HDLC SERPL-MCNC: 43 MG/DL (ref 40–59)
LDLC SERPL CALC-MCNC: 126 MG/DL (ref ?–100)
NONHDLC SERPL-MCNC: 143 MG/DL (ref ?–130)
TRIGL SERPL-MCNC: 90 MG/DL (ref 30–149)
VLDLC SERPL CALC-MCNC: 16 MG/DL (ref 0–30)

## 2024-09-11 PROCEDURE — 83036 HEMOGLOBIN GLYCOSYLATED A1C: CPT | Performed by: INTERNAL MEDICINE

## 2024-09-11 PROCEDURE — 80061 LIPID PANEL: CPT | Performed by: INTERNAL MEDICINE

## 2024-09-11 NOTE — PROGRESS NOTES
[facility] part of [facility]  Return Patient Progress Note      HPI:     Chief Complaint   Patient presents with    Lab Results     EKG and blood work     Physical     Annual physical        Greer Ricardo is a 39 year old female presenting for:    Has a significant  has a past medical history of Anxiety.      Here for annual.           Labs:   CMP:  Lab Results   Component Value Date/Time     2024 03:49 PM    K 3.8 2024 03:49 PM     2024 03:49 PM    CO2 28.0 2024 03:49 PM    CREATSERUM 0.71 2024 03:49 PM    CA 9.6 2024 03:49 PM    GLU 86 2024 03:49 PM    TP 7.8 2024 03:49 PM    ALB 5.0 (H) 2024 03:49 PM    ALKPHO 61 2024 03:49 PM    AST 22 2024 03:49 PM    ALT 33 2024 03:49 PM    BILT 0.5 2024 03:49 PM    TSH 1.765 2024 03:49 PM    T4F 1.1 2024 03:49 PM        CBC:  Lab Results   Component Value Date    WBC 8.0 2024    HGB 12.8 2024    HCT 40.2 2024    .0 2024    NEPERCENT 66.9 2024    LYPERCENT 25.4 2024    MOPERCENT 6.0 2024    EOPERCENT 0.8 2024    BAPERCENT 0.6 2024    NE 5.36 2024    LYMABS 2.03 2024    MOABSO 0.48 2024    EOABSO 0.06 2024    BAABSO 0.05 2024          Hemoglobin A1C, Microalbumin  Lab Results   Component Value Date/Time    A1C 6.0 (H) 2024 03:49 PM        Lipid panel  Lab Results   Component Value Date/Time    CHOLEST 216 (H) 2024 03:49 PM    HDL 42 2024 03:49 PM    TRIG 118 2024 03:49 PM     (H) 2024 03:49 PM    NONHDLC 174 (H) 2024 03:49 PM        Medications:  No current outpatient medications on file.      PMH:  Past Medical History:    Anxiety         PSH:  Past Surgical History:   Procedure Laterality Date             Allergies:  No Known Allergies   Social History:  Social History     Socioeconomic History    Marital  status:    Tobacco Use    Smoking status: Never    Smokeless tobacco: Never   Substance and Sexual Activity    Alcohol use: No      Family History:  Family History   Problem Relation Age of Onset    Asthma Son     Other (Other) Other         thyroid              REVIEW OF SYSTEMS:   Review of Systems   Constitutional:  Negative for chills, fatigue, fever and unexpected weight change.   HENT:  Negative for congestion, ear pain, hearing loss, rhinorrhea, sinus pain and sore throat.    Eyes:  Negative for pain, redness and visual disturbance.   Respiratory:  Negative for apnea, cough, chest tightness, shortness of breath and wheezing.    Cardiovascular:  Negative for chest pain, palpitations and leg swelling.   Gastrointestinal:  Negative for abdominal distention, abdominal pain, blood in stool, constipation and nausea.   Endocrine: Negative for cold intolerance, heat intolerance and polyuria.   Genitourinary:  Negative for dysuria, hematuria and urgency.   Musculoskeletal:  Negative for arthralgias, back pain, gait problem, joint swelling, myalgias and neck pain.   Skin:  Negative for rash and wound.   Allergic/Immunologic: Negative for food allergies and immunocompromised state.   Neurological:  Negative for dizziness, seizures, facial asymmetry, speech difficulty, weakness, light-headedness, numbness and headaches.   Hematological:  Negative for adenopathy. Does not bruise/bleed easily.   Psychiatric/Behavioral:  Negative for behavioral problems, sleep disturbance and suicidal ideas. The patient is not nervous/anxious.             PHYSICAL EXAM:   /78   Pulse 83   Ht 5' (1.524 m)   Wt 161 lb (73 kg)   LMP 07/02/2024 (Approximate)   SpO2 99%   BMI 31.44 kg/m²  Estimated body mass index is 31.44 kg/m² as calculated from the following:    Height as of this encounter: 5' (1.524 m).    Weight as of this encounter: 161 lb (73 kg).     Wt Readings from Last 3 Encounters:   09/11/24 161 lb (73 kg)    06/11/24 180 lb 9.6 oz (81.9 kg)   12/12/19 159 lb (72.1 kg)       Physical Exam  Vitals reviewed.   Constitutional:       General: She is not in acute distress.     Appearance: She is well-developed.   HENT:      Head: Normocephalic and atraumatic.   Eyes:      Conjunctiva/sclera: Conjunctivae normal.      Pupils: Pupils are equal, round, and reactive to light.   Neck:      Thyroid: No thyromegaly.   Cardiovascular:      Rate and Rhythm: Normal rate and regular rhythm.      Heart sounds: Normal heart sounds, S1 normal and S2 normal. No murmur heard.     No friction rub. No gallop.   Pulmonary:      Effort: Pulmonary effort is normal. No respiratory distress.      Breath sounds: Normal breath sounds. No wheezing or rales.   Chest:      Chest wall: No tenderness.   Abdominal:      General: Bowel sounds are normal. There is no distension.      Palpations: Abdomen is soft. There is no mass.      Tenderness: There is no abdominal tenderness. There is no guarding or rebound.   Musculoskeletal:         General: No tenderness. Normal range of motion.      Cervical back: Normal range of motion.   Lymphadenopathy:      Cervical: No cervical adenopathy.   Skin:     General: Skin is warm.      Findings: No erythema or rash.   Neurological:      Mental Status: She is alert and oriented to person, place, and time.      Cranial Nerves: No cranial nerve deficit.      Deep Tendon Reflexes: Reflexes are normal and symmetric.   Psychiatric:         Behavior: Behavior normal.         Thought Content: Thought content normal.         Judgment: Judgment normal.               ASSESSMENT AND PLAN:   Patient is a 39 year old female who presents primarily presents for:    (Z00.00) Annual physical exam  (primary encounter diagnosis)  During the annual physical exam I spent extensive time discussing medical history including existing conditions, past surgeries, allergies and medications.  Lifestyle factors discussed including diet, exercise  and substance abuse including alcohol and tobacco if warranted.  Counseled on screening tests based on age and underlying risk factors which may include but not limited to blood pressure measurement, cholesterol screening, diabetes screening and prostate cancer screening.  Preventative screenings discussed.  Reviewed immunizations.  Sexual health discussed if appropriate.  Family history reviewed.  I addressed any specific concerns or symptoms that the patient had.        (R73.03) Prediabetes  Plan: Hemoglobin A1C (Glycohemoglobin) [E], Lipid         Panel                Health Maintenance:    Health Maintenance   Topic Date Due    Annual Physical  Never done    DTaP,Tdap,and Td Vaccines (1 - Tdap) Never done    Pap Smear  02/18/2022    COVID-19 Vaccine (3 - 2023-24 season) 09/01/2024    Influenza Vaccine (1) 10/01/2024    Mammogram  07/11/2025    Annual Depression Screening  Completed    Pneumococcal Vaccine: Birth to 64yrs  Aged Out         Meds & Refills for this Visit:  Requested Prescriptions      No prescriptions requested or ordered in this encounter       No orders of the defined types were placed in this encounter.      Imaging & Consults:  None          No follow-ups on file.  Important follow up notes/labs for next visit      Patient indicates understanding of the above recommendations and agrees to the above plan.  Reasurrance and education provided. All questions answered.    Notified to call with any questions, complications, allergies, or worsening or changing symptoms as well as any side effects or complications from the treatments .  Red flags/ ER precautions discussed.    If diagnostic labs or imaging ordered advised patient to contact my office for results  24-48 hours after completion    This note was dictated using dragon speech recognition transcription software.  Typographical and transcription errors may be present.  Please call if any questions.             MD AUSTEN Guerrero  5

## 2025-01-15 ENCOUNTER — OFFICE VISIT (OUTPATIENT)
Dept: INTERNAL MEDICINE CLINIC | Facility: CLINIC | Age: 40
End: 2025-01-15
Payer: COMMERCIAL

## 2025-01-15 VITALS
SYSTOLIC BLOOD PRESSURE: 108 MMHG | HEART RATE: 92 BPM | HEIGHT: 60 IN | OXYGEN SATURATION: 96 % | WEIGHT: 161.81 LBS | DIASTOLIC BLOOD PRESSURE: 72 MMHG | BODY MASS INDEX: 31.77 KG/M2 | TEMPERATURE: 98 F

## 2025-01-15 DIAGNOSIS — Z00.00 WELLNESS EXAMINATION: Primary | ICD-10-CM

## 2025-01-15 DIAGNOSIS — Z12.4 SCREENING FOR CERVICAL CANCER: ICD-10-CM

## 2025-01-15 PROCEDURE — 87624 HPV HI-RISK TYP POOLED RSLT: CPT | Performed by: FAMILY MEDICINE

## 2025-01-15 PROCEDURE — 88175 CYTOPATH C/V AUTO FLUID REDO: CPT | Performed by: FAMILY MEDICINE

## 2025-01-15 NOTE — PROGRESS NOTES
CC: Annual Physical Exam    HPI:   Greer Ricardo is a 39 year old female who presents for a complete physical exam.    HCM  -Diet:  Well-balanced.   -Exercise regularly  -Mental Health: denies any depression or anxiety sx. Some stress but managing  -Skin care:  no concerning lesions/moles  -Menses: regular cycles. No heavy bleeding or cramping        Wt Readings from Last 6 Encounters:   01/15/25 161 lb 12.8 oz (73.4 kg)   09/11/24 161 lb (73 kg)   06/11/24 180 lb 9.6 oz (81.9 kg)   12/12/19 159 lb (72.1 kg)   04/02/19 149 lb (67.6 kg)   02/20/19 151 lb (68.5 kg)     Body mass index is 31.6 kg/m².     Results for orders placed or performed in visit on 01/15/25   Hpv Dna  High Risk , Thin Prep Collect    Collection Time: 01/15/25 12:35 PM   Result Value Ref Range    HPV High Risk Negative Negative    HPV Source Cervical/endocervical/vaginal    THINPREP PAP SMEAR ONLY    Collection Time: 01/15/25 12:35 PM   Result Value Ref Range    Interpretation/Result Negative for intraepithelial lesion or malignancy Negative for intraepithelial lesion or malignancy    Specimen Adequacy       Satisfactory for evaluation. Endocervical or metaplastic cells present    General Categorization Negative for intraepithelial lesion or malignancy       HPV High Risk mRNA       Negative  Normal        Recommendations/Comments       Screened by the Thinprep Imaging System and a cytotechnologist.      Embedded Images      Procedure       Monolayers:  1, specimen collected in Thinprep vial, 20 ml Preservcyt      Clinical Information       Z12.4 Screening For Cervical Cancer.         Reason for testing Screening     Gyn Additional Information       NOTE:  The Pap smear is a screening test that aids in the detection of cervical cancer and its precursors.  False negative and false positive results can occur. Regular sampling is recommended to minimize false negative results.      Case Report       Gynecologic Cytology                               Case: [id number]                                  Authorizing Provider:  Suzanne Vivas,  Collected:           01/15/2025 12:35 PM                                 MD                                                                           Ordering Location:     [facility]    Received:            2025 10:04 AM                                 Group, [facility]                                                                     First Screen:          Sidney Hair                                                                Specimen:    ThinPrep Imager Screening Pap, Cervical/endocervical/vaginal                                  No current outpatient medications on file.      Allergies[1]   Past Medical History:    Anxiety      Past Surgical History:   Procedure Laterality Date            Family History   Problem Relation Age of Onset    Asthma Son     Other (Other) Other         thyroid      Social History:   Social History     Socioeconomic History    Marital status:    Tobacco Use    Smoking status: Never    Smokeless tobacco: Never   Substance and Sexual Activity    Alcohol use: No          REVIEW OF SYSTEMS:   Review of Systems   Constitutional:  Negative for chills, fatigue and fever.   Respiratory:  Negative for cough and shortness of breath.    Cardiovascular:  Negative for chest pain, palpitations and leg swelling.   Gastrointestinal:  Negative for abdominal pain, constipation, diarrhea and vomiting.   Neurological:  Negative for headaches.            PHYSICAL EXAM:   /72   Pulse 92   Temp 97.9 °F (36.6 °C)   Ht 5' (1.524 m)   Wt 161 lb 12.8 oz (73.4 kg)   LMP 2025 (Exact Date)   SpO2 96%   BMI 31.60 kg/m²  Estimated body mass index is 31.6 kg/m² as calculated from the following:    Height as of this encounter: 5' (1.524 m).    Weight as of this  encounter: 161 lb 12.8 oz (73.4 kg).     Wt Readings from Last 3 Encounters:   01/15/25 161 lb 12.8 oz (73.4 kg)   09/11/24 161 lb (73 kg)   06/11/24 180 lb 9.6 oz (81.9 kg)       Physical Exam  Vitals reviewed.   Constitutional:       General: She is not in acute distress.     Appearance: She is well-developed.   HENT:      Head: Normocephalic.      Right Ear: Tympanic membrane and external ear normal.      Left Ear: Tympanic membrane and external ear normal.   Eyes:      Conjunctiva/sclera: Conjunctivae normal.      Pupils: Pupils are equal, round, and reactive to light.   Neck:      Thyroid: No thyromegaly.   Cardiovascular:      Rate and Rhythm: Normal rate and regular rhythm.      Heart sounds: Normal heart sounds. No murmur heard.  Pulmonary:      Effort: Pulmonary effort is normal. No respiratory distress.      Breath sounds: Normal breath sounds.   Abdominal:      General: Bowel sounds are normal. There is no distension.      Palpations: Abdomen is soft.      Tenderness: There is no abdominal tenderness.   Genitourinary:     General: Normal vulva.      Vagina: Normal.      Cervix: Normal.      Uterus: Normal.       Adnexa: Right adnexa normal and left adnexa normal.   Musculoskeletal:         General: Normal range of motion.      Cervical back: Normal range of motion and neck supple.      Right lower leg: No edema.      Left lower leg: No edema.   Skin:     Findings: No rash.   Neurological:      General: No focal deficit present.      Cranial Nerves: No cranial nerve deficit.           ASSESSMENT AND PLAN:   Greer Ricardo is a 39 year old female who presents for a complete physical exam.      Health maintenance, will check :   Orders Placed This Encounter   Procedures    CBC With Differential With Platelet    Comp Metabolic Panel (14)    Hemoglobin A1C    Lipid Panel    TSH W Reflex To Free T4    Hpv Dna  High Risk , Thin Prep Collect    ThinPrep PAP Smear    THINPREP PAP SMEAR ONLY       Diagnoses  and all orders for this visit:    Wellness examination  -     CBC With Differential With Platelet; Future  -     Comp Metabolic Panel (14); Future  -     Hemoglobin A1C; Future  -     Lipid Panel; Future  -     TSH W Reflex To Free T4; Future  -     Pt reassured and all questions answered.  -     Age/sex specific preventive measures/immunizations reviewed and discussed with pt.  -     Pt counseled with regards to diet and exercise.    Screening for cervical cancer  -     ThinPrep PAP Smear; Future  -     Hpv Dna  High Risk , Thin Prep Collect; Future  -     THINPREP PAP SMEAR ONLY         Health Maintenance Due   Topic Date Due    DTaP,Tdap,and Td Vaccines (1 - Tdap) Never done    Pap Smear  02/18/2022    COVID-19 Vaccine (3 - 2024-25 season) 09/01/2024    Influenza Vaccine (1) 10/01/2024         The patient indicates understanding of these issues and agrees to the plan.  Return if symptoms worsen or fail to improve.  Reasurrance and education provided. All questions answered. Red flags/ ER precautions discussed.               [1] No Known Allergies

## 2025-01-16 LAB — HPV E6+E7 MRNA CVX QL NAA+PROBE: NEGATIVE

## (undated) NOTE — LETTER
Shan Cruz Md  [address] [facility]  [address], [place]       02/20/19        Patient: Frankie Velez   YOB: 1985   Date of Visit: 2/20/2019       Dear  Dr. Diogenes Paul Recipients,      Thank you for referring Marixa Rosario

## (undated) NOTE — LETTER
Dr. Monique Brown MD     [facility], [place] [address]  [phone]     Date: 6/20/2019    Patient Name: Grazyna Gann          To Whom it may co

## (undated) NOTE — LETTER
Dr. Damian Green MD     [facility] [address]  [address] [facility]  [phone]   Date: 6/20/2019    Patient Name: Becca Mayberryantionette          To Whom it may concern: